# Patient Record
Sex: FEMALE | Race: WHITE | NOT HISPANIC OR LATINO | Employment: FULL TIME | URBAN - METROPOLITAN AREA
[De-identification: names, ages, dates, MRNs, and addresses within clinical notes are randomized per-mention and may not be internally consistent; named-entity substitution may affect disease eponyms.]

---

## 2023-09-26 ENCOUNTER — OFFICE VISIT (OUTPATIENT)
Dept: URGENT CARE | Facility: CLINIC | Age: 48
End: 2023-09-26

## 2023-09-26 VITALS
OXYGEN SATURATION: 99 % | WEIGHT: 184 LBS | HEART RATE: 92 BPM | RESPIRATION RATE: 18 BRPM | DIASTOLIC BLOOD PRESSURE: 86 MMHG | BODY MASS INDEX: 30.66 KG/M2 | SYSTOLIC BLOOD PRESSURE: 123 MMHG | HEIGHT: 65 IN | TEMPERATURE: 98 F

## 2023-09-26 DIAGNOSIS — Z76.89 ENCOUNTER TO ESTABLISH CARE: ICD-10-CM

## 2023-09-26 DIAGNOSIS — H65.03 NON-RECURRENT ACUTE SEROUS OTITIS MEDIA OF BOTH EARS: ICD-10-CM

## 2023-09-26 DIAGNOSIS — R42 DIZZINESS: Primary | ICD-10-CM

## 2023-09-26 LAB
CTP QC/QA: YES
CTP QC/QA: YES
GLUCOSE SERPL-MCNC: 164 MG/DL (ref 70–110)
POC MOLECULAR INFLUENZA A AGN: NEGATIVE
POC MOLECULAR INFLUENZA B AGN: NEGATIVE
SARS-COV-2 AG RESP QL IA.RAPID: NEGATIVE

## 2023-09-26 PROCEDURE — 87502 INFLUENZA DNA AMP PROBE: CPT | Mod: QW,TIER,S$GLB,

## 2023-09-26 PROCEDURE — 82962 POCT GLUCOSE, HAND-HELD DEVICE: ICD-10-PCS | Mod: TIER,S$GLB,,

## 2023-09-26 PROCEDURE — 87811 SARS-COV-2 COVID19 W/OPTIC: CPT | Mod: QW,TIER,S$GLB,

## 2023-09-26 PROCEDURE — 99204 PR OFFICE/OUTPT VISIT, NEW, LEVL IV, 45-59 MIN: ICD-10-PCS | Mod: TIER,S$GLB,,

## 2023-09-26 PROCEDURE — 87811 SARS CORONAVIRUS 2 ANTIGEN POCT, MANUAL READ: ICD-10-PCS | Mod: QW,TIER,S$GLB,

## 2023-09-26 PROCEDURE — 82962 GLUCOSE BLOOD TEST: CPT | Mod: TIER,S$GLB,,

## 2023-09-26 PROCEDURE — 93010 EKG 12-LEAD: ICD-10-PCS | Mod: TIER,S$GLB,, | Performed by: INTERNAL MEDICINE

## 2023-09-26 PROCEDURE — 87502 POCT INFLUENZA A/B MOLECULAR: ICD-10-PCS | Mod: QW,TIER,S$GLB,

## 2023-09-26 PROCEDURE — 93005 ELECTROCARDIOGRAM TRACING: CPT | Mod: TIER,S$GLB,,

## 2023-09-26 PROCEDURE — 93010 ELECTROCARDIOGRAM REPORT: CPT | Mod: TIER,S$GLB,, | Performed by: INTERNAL MEDICINE

## 2023-09-26 PROCEDURE — 99204 OFFICE O/P NEW MOD 45 MIN: CPT | Mod: TIER,S$GLB,,

## 2023-09-26 PROCEDURE — 93005 EKG 12-LEAD: ICD-10-PCS | Mod: TIER,S$GLB,,

## 2023-09-26 RX ORDER — MECLIZINE HYDROCHLORIDE 25 MG/1
25 TABLET ORAL 2 TIMES DAILY PRN
Qty: 6 TABLET | Refills: 0 | Status: SHIPPED | OUTPATIENT
Start: 2023-09-26 | End: 2023-09-29

## 2023-09-26 RX ORDER — GUAIFENESIN AND DEXTROMETHORPHAN HYDROBROMIDE 600; 30 MG/1; MG/1
1 TABLET, EXTENDED RELEASE ORAL 2 TIMES DAILY
Qty: 20 TABLET | Refills: 0 | Status: SHIPPED | OUTPATIENT
Start: 2023-09-26 | End: 2023-10-06

## 2023-09-26 RX ORDER — ESTRADIOL 2 MG/1
2 TABLET ORAL
COMMUNITY
Start: 2023-09-25

## 2023-09-26 RX ORDER — FLUTICASONE PROPIONATE 50 MCG
1 SPRAY, SUSPENSION (ML) NASAL DAILY
Qty: 9.9 ML | Refills: 0 | Status: SHIPPED | OUTPATIENT
Start: 2023-09-26

## 2023-09-26 RX ORDER — CETIRIZINE HYDROCHLORIDE 10 MG/1
10 TABLET ORAL DAILY
Qty: 30 TABLET | Refills: 0 | Status: SHIPPED | OUTPATIENT
Start: 2023-09-26 | End: 2023-09-26 | Stop reason: ALTCHOICE

## 2023-09-26 RX ORDER — LOSARTAN POTASSIUM 50 MG/1
50 TABLET ORAL
COMMUNITY
Start: 2023-09-25

## 2023-09-26 NOTE — PROGRESS NOTES
"Subjective:      Patient ID: Sharon Plunkett is a 47 y.o. female.    Vitals:  height is 5' 5" (1.651 m) and weight is 83.5 kg (184 lb). Her oral temperature is 98.3 °F (36.8 °C). Her blood pressure is 123/86 and her pulse is 92. Her respiration is 18 and oxygen saturation is 99%.     Chief Complaint: Headache    PT is coming due to having an episode of "dizziness" an hour ago at work. Pt stated she sat down when she felt light headed then broke out in sweats. Pt has hx of high bp and wondered if that may be the reason. Stated she has been taking her losartan as prescribed. Pt denies fever, urinary symptoms, CP/SOB, N/V, LOC, hematochezia, melena. Pt stated she is not "dizzy" in clinic.     Other  This is a new problem. The current episode started today. The problem occurs rarely. The problem has been unchanged. Associated symptoms include headaches and nausea. Pertinent negatives include no congestion, coughing, fever, sore throat or vomiting. Nothing aggravates the symptoms. She has tried nothing for the symptoms. The treatment provided no relief.       Constitution: Negative for fever.   HENT:  Negative for congestion and sore throat.    Respiratory:  Negative for cough.    Gastrointestinal:  Positive for nausea. Negative for vomiting.   Neurological:  Positive for headaches.      Objective:     Physical Exam   Constitutional: She is oriented to person, place, and time. She appears well-developed. She is cooperative.  Non-toxic appearance. She does not appear ill. No distress.      Comments:Patient is AAO and NAD. Pt is able to lie on examination table with ease and sit up on table without symptoms.      HENT:   Head: Normocephalic and atraumatic.   Ears:   Right Ear: Hearing, external ear and ear canal normal.   Left Ear: Hearing, external ear and ear canal normal.      Comments: Cloudy TM present bilaterally.   Nose: Nose normal. No mucosal edema, rhinorrhea or nasal deformity. No epistaxis. Right sinus " exhibits no maxillary sinus tenderness and no frontal sinus tenderness. Left sinus exhibits no maxillary sinus tenderness and no frontal sinus tenderness.   Mouth/Throat: Uvula is midline and mucous membranes are normal. Mucous membranes are moist. No trismus in the jaw. Normal dentition. No uvula swelling. Posterior oropharyngeal erythema and cobblestoning present. No oropharyngeal exudate or posterior oropharyngeal edema.   Eyes: Conjunctivae and lids are normal. No scleral icterus.   Neck: Trachea normal and phonation normal. Neck supple. No edema present. No erythema present. No neck rigidity present.   Cardiovascular: Normal rate and regular rhythm.   Pulmonary/Chest: Effort normal and breath sounds normal. No respiratory distress. She has no decreased breath sounds. She has no wheezes. She has no rhonchi.   Abdominal: Normal appearance.   Musculoskeletal: Normal range of motion.         General: No deformity. Normal range of motion.   Neurological: no focal deficit. She is alert and oriented to person, place, and time. She exhibits normal muscle tone. Coordination normal.      Comments:  strength intact bilaterally. Pt talking in full complete sentences.    Skin: Skin is warm, dry, intact, not diaphoretic and not pale.   Psychiatric: Her speech is normal and behavior is normal. Judgment and thought content normal.   Nursing note and vitals reviewed.    Results for orders placed or performed in visit on 09/26/23   SARS Coronavirus 2 Antigen, POCT Manual Read   Result Value Ref Range    SARS Coronavirus 2 Antigen Negative Negative     Acceptable Yes    POCT Glucose, Hand-Held Device   Result Value Ref Range    POC Glucose 164 (A) 70 - 110 MG/DL   POCT Influenza A/B MOLECULAR   Result Value Ref Range    POC Molecular Influenza A Ag Negative Negative, Not Reported    POC Molecular Influenza B Ag Negative Negative, Not Reported     Acceptable Yes        Vent. Rate : 075 BPM      Atrial Rate : 075 BPM      P-R Int : 144 ms          QRS Dur : 086 ms       QT Int : 410 ms       P-R-T Axes : 038 013 026 degrees      QTc Int : 457 ms     Normal sinus rhythm   Normal ECG   No previous ECGs available   Assessment:     1. Dizziness    2. Encounter to establish care    3. Non-recurrent acute serous otitis media of both ears        Plan:       Dizziness  -     SARS Coronavirus 2 Antigen, POCT Manual Read  -     IN OFFICE EKG 12-LEAD (to Muse)  -     POCT Glucose, Hand-Held Device  -     POCT Influenza A/B MOLECULAR  -     meclizine (ANTIVERT) 25 mg tablet; Take 1 tablet (25 mg total) by mouth 2 (two) times daily as needed for Dizziness or Nausea.  Dispense: 6 tablet; Refill: 0    Encounter to establish care  -     Ambulatory referral/consult to Family Practice    Non-recurrent acute serous otitis media of both ears  -     dextromethorphan-guaiFENesin  mg (MUCINEX DM)  mg per 12 hr tablet; Take 1 tablet by mouth 2 (two) times daily. for 10 days  Dispense: 20 tablet; Refill: 0  -     Discontinue: cetirizine (ZYRTEC) 10 MG tablet; Take 1 tablet (10 mg total) by mouth once daily.  Dispense: 30 tablet; Refill: 0  -     fluticasone propionate (FLONASE) 50 mcg/actuation nasal spray; 1 spray (50 mcg total) by Each Nostril route once daily.  Dispense: 9.9 mL; Refill: 0        VS stable, afebrile.   Medical Decision Making:   Urgent Care Management:  Discussed with patient the limitations of Urgent Care. Discussed and reviewed all lab tests done in clinic. VS stable and patient appears well in clinic. Advised patient to have close PCP follow up in 2-3 days for evaluation of elevated blood sugar. Increase PO fluids. Discussed with patient if symptoms continue or worsen, she must go to nearest ED for further evaluation. Pt verbalized understanding.   Covid and flu negative. Glucose is 164 in clinic. Pt is not diabetic and stated she ate earlier. Advised patient to have close PCP follow up for further  evaluation for elevated glucose reading. Advised to increase PO fluids and low sugar diet.     Discussed ECG findings with patient- 75bpm, NSR, no STEMI   You have tested negative for COVID on our Binax test. As a follow up the recommendation is if you have symptoms within the first 7 days to retest within 48 hours. If you have NO SYMPTOMS then you should test every 48 hours two more times. You can use a purchased covid test at home.    Discussed with patient the importance of f/u with their primary care provider. Urged to go to the ER for any worsening signs or symptoms.     Patient Instructions   1.  Take all medications as directed. If you have been prescribed antibiotics, make sure to complete them.   2.  Rest and keep yourself/patient well hydrated. For adults, it is recommended to drink at least 8-10 glasses of water daily.   3.  For patients above 6 months of age who are not allergic to and are not on anticoagulants, you can alternate Tylenol and Motrin every 4-6 hours for fever above 100.4F and/or pain.  For patients less than 6 months of age, allergic to or intolerant to NSAIDS, have gastritis, gastric ulcers, or history of GI bleeds, are pregnant, or are on anticoagulant therapy, you can take Tylenol every 4 hours as needed for fever above 100.4F and/or pain.   4. You should schedule a follow-up appointment with your Primary Care Provider/Pediatrician for recheck in 2-3 days or as directed at this visit.   5.  If your condition fails to improve in a timely manner, you should receive another evaluation by your Primary Care Provider/Pediatrician to discuss your concerns or return to urgent care for a recheck.  If your condition worsens at any time, you should report immediately to your nearest Emergency Department for further evaluation. **You must understand that you have received Urgent Care treatment only and that you may be released before all of your medical problems are known or treated. You, the  patient, are responsible to arrange for follow-up care as instructed.

## 2023-09-26 NOTE — LETTER
September 26, 2023      [facility]  [address], [address]  [place] [address]  Phone: [phone]  Fax: [phone]       Patient: Sharon Plunkett   YOB: 1975  Date of Visit: 09/26/2023    To Whom It May Concern:    Saritha Plunkett  was at [facility] on 09/26/2023. The patient may return to work/school on 9/27/23 with no restrictions. If you have any questions or concerns, or if I can be of further assistance, please do not hesitate to contact me.    Sincerely,    Jaqueline Hatfield PA-C

## 2023-09-27 ENCOUNTER — TELEPHONE (OUTPATIENT)
Dept: URGENT CARE | Facility: CLINIC | Age: 48
End: 2023-09-27